# Patient Record
Sex: MALE | Race: WHITE | Employment: OTHER | URBAN - NONMETROPOLITAN AREA
[De-identification: names, ages, dates, MRNs, and addresses within clinical notes are randomized per-mention and may not be internally consistent; named-entity substitution may affect disease eponyms.]

---

## 2019-02-11 LAB
CHOLESTEROL, TOTAL: 161 MG/DL
CHOLESTEROL/HDL RATIO: 3
HDLC SERPL-MCNC: 53 MG/DL (ref 35–70)
LDL CHOLESTEROL CALCULATED: 87 MG/DL (ref 0–160)
TRIGL SERPL-MCNC: 111 MG/DL
VLDLC SERPL CALC-MCNC: NORMAL MG/DL

## 2019-06-12 LAB
ALBUMIN SERPL-MCNC: NORMAL G/DL
ALP BLD-CCNC: NORMAL U/L
ALT SERPL-CCNC: NORMAL U/L
ANION GAP SERPL CALCULATED.3IONS-SCNC: NORMAL MMOL/L
AST SERPL-CCNC: NORMAL U/L
BASOPHILS ABSOLUTE: NORMAL /ΜL
BASOPHILS RELATIVE PERCENT: NORMAL %
BILIRUB SERPL-MCNC: NORMAL MG/DL (ref 0.1–1.4)
BUN BLDV-MCNC: NORMAL MG/DL
CALCIUM SERPL-MCNC: NORMAL MG/DL
CHLORIDE BLD-SCNC: NORMAL MMOL/L
CO2: NORMAL MMOL/L
CREAT SERPL-MCNC: NORMAL MG/DL
EOSINOPHILS ABSOLUTE: NORMAL /ΜL
EOSINOPHILS RELATIVE PERCENT: NORMAL %
GFR CALCULATED: NORMAL
GLUCOSE BLD-MCNC: NORMAL MG/DL
HCT VFR BLD CALC: NORMAL % (ref 41–53)
HEMOGLOBIN: NORMAL G/DL (ref 13.5–17.5)
LYMPHOCYTES ABSOLUTE: NORMAL /ΜL
LYMPHOCYTES RELATIVE PERCENT: NORMAL %
MCH RBC QN AUTO: NORMAL PG
MCHC RBC AUTO-ENTMCNC: NORMAL G/DL
MCV RBC AUTO: NORMAL FL
MONOCYTES ABSOLUTE: NORMAL /ΜL
MONOCYTES RELATIVE PERCENT: NORMAL %
NEUTROPHILS ABSOLUTE: NORMAL /ΜL
NEUTROPHILS RELATIVE PERCENT: NORMAL %
PLATELET # BLD: NORMAL K/ΜL
PMV BLD AUTO: NORMAL FL
POTASSIUM SERPL-SCNC: NORMAL MMOL/L
RBC # BLD: NORMAL 10^6/ΜL
SODIUM BLD-SCNC: NORMAL MMOL/L
TOTAL PROTEIN: NORMAL
WBC # BLD: NORMAL 10^3/ML

## 2019-06-18 LAB
ALBUMIN SERPL-MCNC: NORMAL G/DL
ALP BLD-CCNC: NORMAL U/L
ALT SERPL-CCNC: NORMAL U/L
ANION GAP SERPL CALCULATED.3IONS-SCNC: NORMAL MMOL/L
AST SERPL-CCNC: NORMAL U/L
BILIRUB SERPL-MCNC: NORMAL MG/DL (ref 0.1–1.4)
BUN BLDV-MCNC: NORMAL MG/DL
CALCIUM SERPL-MCNC: NORMAL MG/DL
CHLORIDE BLD-SCNC: NORMAL MMOL/L
CO2: NORMAL MMOL/L
CREAT SERPL-MCNC: NORMAL MG/DL
GFR CALCULATED: NORMAL
GLUCOSE BLD-MCNC: NORMAL MG/DL
POTASSIUM SERPL-SCNC: NORMAL MMOL/L
SODIUM BLD-SCNC: NORMAL MMOL/L
TOTAL PROTEIN: NORMAL

## 2019-06-27 VITALS
HEIGHT: 68 IN | DIASTOLIC BLOOD PRESSURE: 82 MMHG | WEIGHT: 160.25 LBS | BODY MASS INDEX: 24.29 KG/M2 | HEART RATE: 85 BPM | SYSTOLIC BLOOD PRESSURE: 134 MMHG | TEMPERATURE: 98.2 F

## 2019-06-28 ENCOUNTER — OFFICE VISIT (OUTPATIENT)
Dept: FAMILY MEDICINE CLINIC | Age: 74
End: 2019-06-28
Payer: MEDICARE

## 2019-06-28 VITALS
OXYGEN SATURATION: 97 % | DIASTOLIC BLOOD PRESSURE: 92 MMHG | WEIGHT: 149 LBS | TEMPERATURE: 98 F | SYSTOLIC BLOOD PRESSURE: 156 MMHG | HEART RATE: 102 BPM | BODY MASS INDEX: 22.07 KG/M2 | HEIGHT: 69 IN

## 2019-06-28 DIAGNOSIS — C20 RECTAL CANCER (HCC): Primary | ICD-10-CM

## 2019-06-28 DIAGNOSIS — K40.20 NON-RECURRENT BILATERAL INGUINAL HERNIA WITHOUT OBSTRUCTION OR GANGRENE: ICD-10-CM

## 2019-06-28 DIAGNOSIS — R63.4 WEIGHT LOSS: ICD-10-CM

## 2019-06-28 DIAGNOSIS — Z92.21 STATUS POST CHEMOTHERAPY: ICD-10-CM

## 2019-06-28 PROCEDURE — 99214 OFFICE O/P EST MOD 30 MIN: CPT | Performed by: INTERNAL MEDICINE

## 2019-06-28 RX ORDER — AZITHROMYCIN 250 MG/1
TABLET, FILM COATED ORAL
Refills: 0 | COMMUNITY
Start: 2019-06-12 | End: 2019-09-20 | Stop reason: ALTCHOICE

## 2019-06-28 RX ORDER — PROMETHAZINE HYDROCHLORIDE AND CODEINE PHOSPHATE 6.25; 1 MG/5ML; MG/5ML
SOLUTION ORAL
Refills: 0 | COMMUNITY
Start: 2019-06-12 | End: 2019-09-20 | Stop reason: ALTCHOICE

## 2019-06-28 ASSESSMENT — ENCOUNTER SYMPTOMS
EYES NEGATIVE: 1
RHINORRHEA: 0
VOMITING: 0
DIARRHEA: 0
RECTAL PAIN: 0
CONSTIPATION: 0
COUGH: 1
NAUSEA: 0
BLOOD IN STOOL: 0
ABDOMINAL PAIN: 0
WHEEZING: 0
BACK PAIN: 0
SHORTNESS OF BREATH: 0
SINUS PAIN: 0

## 2019-06-28 ASSESSMENT — PATIENT HEALTH QUESTIONNAIRE - PHQ9
SUM OF ALL RESPONSES TO PHQ9 QUESTIONS 1 & 2: 0
SUM OF ALL RESPONSES TO PHQ QUESTIONS 1-9: 0
SUM OF ALL RESPONSES TO PHQ QUESTIONS 1-9: 0
1. LITTLE INTEREST OR PLEASURE IN DOING THINGS: 0
2. FEELING DOWN, DEPRESSED OR HOPELESS: 0

## 2019-06-28 NOTE — PROGRESS NOTES
[address] [facility] [address]     19  Brad Lopez : 1945 Sex: male  Age: 69 y.o. Chief Complaint   Patient presents with    Follow-Up from Hospital     cough, congestion   Follow-up on medical problems    HPI patient presents today for follow-up visit on his medical problems. Recently diagnosed with rectal cancer now has mets. Following with oncology and surgery. He has had a Mediport placed recently and is finished up chemotherapy. I did look over oncology and surgery notes. Patient did have some recent coughing was placed on antibiotic and is doing better. Review of Systems   Constitutional: Positive for unexpected weight change. Negative for activity change, appetite change, chills, diaphoresis, fatigue and fever. HENT: Negative for congestion, ear pain, hearing loss, postnasal drip, rhinorrhea and sinus pain. Eyes: Negative. Respiratory: Positive for cough. Negative for shortness of breath and wheezing. Improving   Cardiovascular: Negative for chest pain, palpitations and leg swelling. Gastrointestinal: Negative for abdominal pain, blood in stool, constipation, diarrhea, nausea, rectal pain and vomiting. Endocrine: Negative. Genitourinary: Negative for difficulty urinating, dysuria, frequency, hematuria and urgency. Musculoskeletal: Negative for arthralgias, back pain, gait problem and myalgias. Skin: Negative. Allergic/Immunologic: Negative for environmental allergies and immunocompromised state. Neurological: Negative for dizziness, weakness, light-headedness, numbness and headaches. Hematological: Negative. Psychiatric/Behavioral: Negative for dysphoric mood and sleep disturbance. The patient is not nervous/anxious. REST OF PERTINENT ROS GONE OVER AND WAS NEGATIVE.        Current Outpatient Medications:     azithromycin (ZITHROMAX) 250 MG tablet, , Disp: , Rfl: 0    promethazine-codeine (PHENERGAN WITH CODEINE) 6.25-10 MG/5ML SOLN solution, TK 5 SpO2: 97%   Weight: 149 lb (67.6 kg)   Height: 5' 9\" (1.753 m)       Physical Exam   Constitutional: He is oriented to person, place, and time. He appears well-developed and well-nourished. No distress. Neck: Normal range of motion. Neck supple. No JVD present. No thyromegaly present. Cardiovascular: Normal rate, regular rhythm, normal heart sounds and intact distal pulses. Exam reveals no gallop and no friction rub. No murmur heard. Pulmonary/Chest: Effort normal and breath sounds normal. No respiratory distress. He has no wheezes. He has no rales. Few scattered rhonchi in the bases bilaterally   Abdominal: Soft. Bowel sounds are normal. He exhibits no distension and no mass. There is no tenderness. Musculoskeletal: Normal range of motion. He exhibits no edema, tenderness or deformity. Lymphadenopathy:     He has no cervical adenopathy. He has no axillary adenopathy. Right: No inguinal adenopathy present. Left: No inguinal adenopathy present. Neurological: He is alert and oriented to person, place, and time. No sensory deficit. He exhibits normal muscle tone. Coordination normal.   Skin: Skin is warm and dry. No rash noted. No erythema. Psychiatric: He has a normal mood and affect. His behavior is normal.   Nursing note and vitals reviewed. Assessment and Plan:  Buddy was seen today for follow-up from hospital.    Diagnoses and all orders for this visit:    Rectal cancer ([facility] [address]    Weight loss    Status post chemotherapy    Non-recurrent bilateral inguinal hernia without obstruction or gangrene    Plan: I will see him back in 3 months and as needed. Continue follow-up with oncology and surgery. Notify us with problems in the interim. Will attempt to get recent labs from oncology. Fall precautions. Return in about 3 months (around 9/28/2019). Seen By:  Jasiel Garduno MD      *Document was created using voice recognition software.   Note was reviewed however may contain grammatical errors.

## 2019-09-20 ENCOUNTER — OFFICE VISIT (OUTPATIENT)
Dept: FAMILY MEDICINE CLINIC | Age: 74
End: 2019-09-20
Payer: MEDICARE

## 2019-09-20 VITALS
BODY MASS INDEX: 22.45 KG/M2 | HEART RATE: 82 BPM | DIASTOLIC BLOOD PRESSURE: 74 MMHG | SYSTOLIC BLOOD PRESSURE: 142 MMHG | OXYGEN SATURATION: 98 % | WEIGHT: 152 LBS

## 2019-09-20 DIAGNOSIS — D70.1 CHEMOTHERAPY-INDUCED NEUTROPENIA (HCC): ICD-10-CM

## 2019-09-20 DIAGNOSIS — E87.6 HYPOKALEMIA: ICD-10-CM

## 2019-09-20 DIAGNOSIS — T45.1X5A CHEMOTHERAPY-INDUCED NEUTROPENIA (HCC): ICD-10-CM

## 2019-09-20 DIAGNOSIS — C20 RECTAL CANCER (HCC): Primary | ICD-10-CM

## 2019-09-20 PROCEDURE — 1036F TOBACCO NON-USER: CPT | Performed by: INTERNAL MEDICINE

## 2019-09-20 PROCEDURE — 99213 OFFICE O/P EST LOW 20 MIN: CPT | Performed by: INTERNAL MEDICINE

## 2019-09-20 PROCEDURE — G8427 DOCREV CUR MEDS BY ELIG CLIN: HCPCS | Performed by: INTERNAL MEDICINE

## 2019-09-20 PROCEDURE — 3017F COLORECTAL CA SCREEN DOC REV: CPT | Performed by: INTERNAL MEDICINE

## 2019-09-20 PROCEDURE — G8420 CALC BMI NORM PARAMETERS: HCPCS | Performed by: INTERNAL MEDICINE

## 2019-09-20 PROCEDURE — 1123F ACP DISCUSS/DSCN MKR DOCD: CPT | Performed by: INTERNAL MEDICINE

## 2019-09-20 PROCEDURE — 4040F PNEUMOC VAC/ADMIN/RCVD: CPT | Performed by: INTERNAL MEDICINE

## 2019-09-20 RX ORDER — ONDANSETRON 8 MG/1
TABLET, ORALLY DISINTEGRATING ORAL
COMMUNITY
Start: 2019-07-09

## 2019-10-30 ENCOUNTER — OFFICE VISIT (OUTPATIENT)
Dept: FAMILY MEDICINE CLINIC | Age: 74
End: 2019-10-30
Payer: MEDICARE

## 2019-10-30 VITALS
BODY MASS INDEX: 22.92 KG/M2 | SYSTOLIC BLOOD PRESSURE: 142 MMHG | OXYGEN SATURATION: 97 % | HEART RATE: 98 BPM | WEIGHT: 155.2 LBS | DIASTOLIC BLOOD PRESSURE: 60 MMHG | TEMPERATURE: 97.7 F

## 2019-10-30 DIAGNOSIS — R63.4 WEIGHT LOSS: ICD-10-CM

## 2019-10-30 DIAGNOSIS — C20 RECTAL CANCER (HCC): Primary | ICD-10-CM

## 2019-10-30 DIAGNOSIS — E43 EDEMA DUE TO MALNUTRITION, DUE TO UNSPECIFIED MALNUTRITION TYPE (HCC): ICD-10-CM

## 2019-10-30 DIAGNOSIS — E87.6 HYPOKALEMIA: ICD-10-CM

## 2019-10-30 PROCEDURE — 1123F ACP DISCUSS/DSCN MKR DOCD: CPT | Performed by: INTERNAL MEDICINE

## 2019-10-30 PROCEDURE — 99214 OFFICE O/P EST MOD 30 MIN: CPT | Performed by: INTERNAL MEDICINE

## 2019-10-30 PROCEDURE — G8484 FLU IMMUNIZE NO ADMIN: HCPCS | Performed by: INTERNAL MEDICINE

## 2019-10-30 PROCEDURE — G8420 CALC BMI NORM PARAMETERS: HCPCS | Performed by: INTERNAL MEDICINE

## 2019-10-30 PROCEDURE — G8427 DOCREV CUR MEDS BY ELIG CLIN: HCPCS | Performed by: INTERNAL MEDICINE

## 2019-10-30 PROCEDURE — 1036F TOBACCO NON-USER: CPT | Performed by: INTERNAL MEDICINE

## 2019-10-30 PROCEDURE — 4040F PNEUMOC VAC/ADMIN/RCVD: CPT | Performed by: INTERNAL MEDICINE

## 2019-10-30 PROCEDURE — 3017F COLORECTAL CA SCREEN DOC REV: CPT | Performed by: INTERNAL MEDICINE

## 2019-10-30 RX ORDER — FUROSEMIDE 20 MG/1
20 TABLET ORAL DAILY PRN
Qty: 4 TABLET | Refills: 0 | Status: SHIPPED
Start: 2019-10-30 | End: 2020-03-05 | Stop reason: SDUPTHER

## 2019-10-30 RX ORDER — POTASSIUM CHLORIDE 20 MEQ/1
20 TABLET, EXTENDED RELEASE ORAL DAILY
Qty: 4 TABLET | Refills: 0 | Status: SHIPPED | OUTPATIENT
Start: 2019-10-30

## 2019-12-23 ENCOUNTER — OFFICE VISIT (OUTPATIENT)
Dept: FAMILY MEDICINE CLINIC | Age: 74
End: 2019-12-23
Payer: MEDICARE

## 2019-12-23 VITALS
HEIGHT: 69 IN | SYSTOLIC BLOOD PRESSURE: 100 MMHG | BODY MASS INDEX: 21.06 KG/M2 | WEIGHT: 142.2 LBS | HEART RATE: 78 BPM | DIASTOLIC BLOOD PRESSURE: 56 MMHG

## 2019-12-23 DIAGNOSIS — C20 RECTAL CANCER (HCC): Primary | ICD-10-CM

## 2019-12-23 DIAGNOSIS — R63.4 WEIGHT LOSS: ICD-10-CM

## 2019-12-23 DIAGNOSIS — E43 EDEMA DUE TO MALNUTRITION, DUE TO UNSPECIFIED MALNUTRITION TYPE (HCC): ICD-10-CM

## 2019-12-23 PROCEDURE — 99213 OFFICE O/P EST LOW 20 MIN: CPT | Performed by: INTERNAL MEDICINE

## 2019-12-23 PROCEDURE — G8420 CALC BMI NORM PARAMETERS: HCPCS | Performed by: INTERNAL MEDICINE

## 2019-12-23 PROCEDURE — G8484 FLU IMMUNIZE NO ADMIN: HCPCS | Performed by: INTERNAL MEDICINE

## 2019-12-23 PROCEDURE — 1036F TOBACCO NON-USER: CPT | Performed by: INTERNAL MEDICINE

## 2019-12-23 PROCEDURE — 1123F ACP DISCUSS/DSCN MKR DOCD: CPT | Performed by: INTERNAL MEDICINE

## 2019-12-23 PROCEDURE — 3017F COLORECTAL CA SCREEN DOC REV: CPT | Performed by: INTERNAL MEDICINE

## 2019-12-23 PROCEDURE — 4040F PNEUMOC VAC/ADMIN/RCVD: CPT | Performed by: INTERNAL MEDICINE

## 2019-12-23 PROCEDURE — G8427 DOCREV CUR MEDS BY ELIG CLIN: HCPCS | Performed by: INTERNAL MEDICINE

## 2019-12-23 RX ORDER — GABAPENTIN 300 MG/1
300 CAPSULE ORAL DAILY
COMMUNITY

## 2019-12-23 RX ORDER — DIPHENOXYLATE HYDROCHLORIDE AND ATROPINE SULFATE 2.5; .025 MG/1; MG/1
1 TABLET ORAL 3 TIMES DAILY PRN
COMMUNITY

## 2019-12-23 RX ORDER — SPIRONOLACTONE 25 MG/1
25 TABLET ORAL 2 TIMES DAILY
COMMUNITY
End: 2020-02-10 | Stop reason: ALTCHOICE

## 2020-01-03 ENCOUNTER — TELEPHONE (OUTPATIENT)
Dept: FAMILY MEDICINE CLINIC | Age: 75
End: 2020-01-03

## 2020-01-03 NOTE — TELEPHONE ENCOUNTER
Harris Ellison - Daughter   [phone]      She is calling to tell you that Nayla Barobsa has had diarrhea for 3 weeks. He was put on Lomotil - 2 tabs 3 times a day by the cancer doctor after trying Imodium and Pepto. She tried to reach them to tell then he is having no relief yet. They are gone for the rest of the day and was told to call his PCP. Please advise.

## 2020-01-04 NOTE — TELEPHONE ENCOUNTER
Unfortunately I do not have anything stronger than Lomotil. Oncology should have somebody on call just like we do. If they cannot reach them through the hospital she may need to take him to the emergency room if it is bad enough to what they can do.

## 2020-01-06 NOTE — TELEPHONE ENCOUNTER
Spoke with Sang Crouch - she states he did go to ER & has been feeling better - ER adjusted diet & he was given fluids.

## 2020-01-28 ENCOUNTER — TELEPHONE (OUTPATIENT)
Dept: FAMILY MEDICINE CLINIC | Age: 75
End: 2020-01-28

## 2020-01-29 ENCOUNTER — TELEPHONE (OUTPATIENT)
Dept: FAMILY MEDICINE CLINIC | Age: 75
End: 2020-01-29

## 2020-01-29 NOTE — TELEPHONE ENCOUNTER
[facility] calling; she saw patient today. He has 3+pitting edema bilateral lower extreme ties. Also 1+pitting edema of left lower arm&hand. Family states they fluid over loaded him at the hospital, it is normal for him to have swelling. She states he has no sob or symptoms, he feels fine. She wanted to make you aware. Also is it okay for in home physical therapy, dx generalized weakness ?

## 2020-01-30 LAB
ALBUMIN SERPL-MCNC: 1.8 G/DL
ALP BLD-CCNC: 370 U/L
ALT SERPL-CCNC: 85 U/L
ANION GAP SERPL CALCULATED.3IONS-SCNC: 0.5 MMOL/L
AST SERPL-CCNC: 63 U/L
BASOPHILS ABSOLUTE: 0 /ΜL
BASOPHILS RELATIVE PERCENT: 0 %
BILIRUB SERPL-MCNC: 1.1 MG/DL (ref 0.1–1.4)
BUN BLDV-MCNC: 54 MG/DL
CALCIUM SERPL-MCNC: 7.6 MG/DL
CHLORIDE BLD-SCNC: 107 MMOL/L
CO2: 25 MMOL/L
CREAT SERPL-MCNC: 1.4 MG/DL
EOSINOPHILS ABSOLUTE: 1 /ΜL
EOSINOPHILS RELATIVE PERCENT: 1 %
GFR CALCULATED: NORMAL
GLUCOSE BLD-MCNC: 63 MG/DL
HCT VFR BLD CALC: 32.4 % (ref 41–53)
HEMOGLOBIN: 10.2 G/DL (ref 13.5–17.5)
LYMPHOCYTES ABSOLUTE: 0 /ΜL
LYMPHOCYTES RELATIVE PERCENT: 0 %
MCH RBC QN AUTO: 31.7 PG
MCHC RBC AUTO-ENTMCNC: 31.6 G/DL
MCV RBC AUTO: 100.4 FL
MONOCYTES ABSOLUTE: 3 /ΜL
MONOCYTES RELATIVE PERCENT: 3 %
NEUTROPHILS ABSOLUTE: 11 /ΜL
NEUTROPHILS RELATIVE PERCENT: 83 %
PLATELET # BLD: 115 K/ΜL
PMV BLD AUTO: 11.4 FL
POTASSIUM SERPL-SCNC: 3.9 MMOL/L
RBC # BLD: 3.22 10^6/ΜL
SODIUM BLD-SCNC: 142 MMOL/L
TOTAL PROTEIN: 5.1
WBC # BLD: 72.1 10^3/ML

## 2020-02-03 ENCOUNTER — NURSE TRIAGE (OUTPATIENT)
Dept: OTHER | Facility: CLINIC | Age: 75
End: 2020-02-03

## 2020-02-10 ENCOUNTER — OFFICE VISIT (OUTPATIENT)
Dept: FAMILY MEDICINE CLINIC | Age: 75
End: 2020-02-10
Payer: MEDICARE

## 2020-02-10 VITALS
HEART RATE: 104 BPM | SYSTOLIC BLOOD PRESSURE: 110 MMHG | HEIGHT: 66 IN | OXYGEN SATURATION: 92 % | DIASTOLIC BLOOD PRESSURE: 68 MMHG | BODY MASS INDEX: 29.41 KG/M2 | WEIGHT: 183 LBS

## 2020-02-10 PROCEDURE — G8484 FLU IMMUNIZE NO ADMIN: HCPCS | Performed by: INTERNAL MEDICINE

## 2020-02-10 PROCEDURE — 4040F PNEUMOC VAC/ADMIN/RCVD: CPT | Performed by: INTERNAL MEDICINE

## 2020-02-10 PROCEDURE — 1123F ACP DISCUSS/DSCN MKR DOCD: CPT | Performed by: INTERNAL MEDICINE

## 2020-02-10 PROCEDURE — 1036F TOBACCO NON-USER: CPT | Performed by: INTERNAL MEDICINE

## 2020-02-10 PROCEDURE — G8419 CALC BMI OUT NRM PARAM NOF/U: HCPCS | Performed by: INTERNAL MEDICINE

## 2020-02-10 PROCEDURE — 99215 OFFICE O/P EST HI 40 MIN: CPT | Performed by: INTERNAL MEDICINE

## 2020-02-10 PROCEDURE — G8427 DOCREV CUR MEDS BY ELIG CLIN: HCPCS | Performed by: INTERNAL MEDICINE

## 2020-02-10 PROCEDURE — 3017F COLORECTAL CA SCREEN DOC REV: CPT | Performed by: INTERNAL MEDICINE

## 2020-02-10 RX ORDER — DOCUSATE SODIUM 100 MG/1
100 CAPSULE, LIQUID FILLED ORAL DAILY
COMMUNITY

## 2020-02-10 RX ORDER — PANTOPRAZOLE SODIUM 40 MG/1
40 TABLET, DELAYED RELEASE ORAL DAILY
COMMUNITY
End: 2020-03-04 | Stop reason: SDUPTHER

## 2020-02-10 NOTE — PROGRESS NOTES
[address] [facility] [address]      2/10/20  Brad Lopez : 1945 Sex: male  Age: 69 y.o. Chief Complaint   Patient presents with    Follow-Up from Hospital       HPI    Patient presents today for post hospital follow-up visit x2. I had really no information as far as discharge summary and had to obtain it during the visit. I did get a copy of the last discharge but still do not have the first discharge. I did review all of the labs, x-rays and documents available to me. 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 Chemotherapy has been deferred recently because of his illness. He is supposed to have chemotherapy tomorrow however I doubt this is going to take place. I did personally speak with his oncologist Dr. Carol Stevens who apparently did not get any of the hospital information either. I did explain the situation and he is going to see him tomorrow. Again it sounds like chemotherapy is going to be deferred. Patient did have group B strep bacteremia and septic shock on his first admission. Back in because of increasing some lower extremity edema fatigue and anemia. Did have a 3 L paracentesis done a couple days ago. It already looks like he is filling back up. Son and daughter-in-law are present in the room as well. They state he is coughing more since the fluid reaccumulation. This did subside shortly after last paracentesis. Patient did receive blood transfusion on his last admission. He had initial hemoglobin of 6.7 transfused up to 9.1. He also did have a large hematoma felt to be pectoralis related with significant bruising down the right side. States he has no pain to this area. No history of known injury. It was also noted that they wanted to remove his foot secondary to his bacteremia but patient and family refused to have that done. Infectious disease was on the case and evaluated things.   Patient has had significant weight gain over the last couple months related to his edema/ascites. Review of Systems   Constitutional: Positive for  weight change.     Weight gain with edema and ascites. fatigue negative for activity change, chills, diaphoresis,  and fever. HENT: Negative for congestion, ear pain, hearing loss, postnasal drip, rhinorrhea and sinus pain.    Eyes: Negative.    Respiratory:  Negative for shortness of breath, and wheezing.  Positive for cough.   Cardiovascular: Negative for chest pain, palpitations positive for significant lower extremity edema and ascites   gastrointestinal: Negative for abdominal pain, blood in stool, constipation, diarrhea, nausea, rectal pain and vomiting. Positive for ascites  Endocrine: Negative.    Genitourinary: Negative for difficulty urinating, dysuria, frequency, hematuria and urgency. Musculoskeletal: Negative for arthralgias, back pain, gait problem and myalgias. Skin: Negative.    Allergic/Immunologic: Negative for environmental allergies. Positive immunosuppressed state with his malignancy and recent chemotherapy neurological: Negative for dizziness, light-headedness, numbness and headaches. Positive for weakness  Hematological: Negative.    Psychiatric/Behavioral: Negative for dysphoric mood and sleep disturbance. The patient is not nervous/anxious. REST OF PERTINENT ROS GONE OVER AND WAS NEGATIVE. PMH:  Health Maintenance:  Colonoscopy - refused,3/19  Prostate Exam - 10/14, , ,   Psa Test - , , ,   Rectal Exam - 10/14, , , ,  EKG - ,  Medical Problems:  inguinal hernia, Hearing Loss, Rectal Cancer-metastatic   hospitalization x2 group B strep bacteremia, edema and ascites, anemia with transfusion, metastatic rectal CA  FH:  Father:  Hypertension  Congestive Heart Failure (CHF) -  age 61. Mother:  Congestive Heart Failure (CHF)  Colon Cancer -  age 80. Brother 1:  Hypertension.   Sister 1:  Hypertension. SH:  . (Marital)retired furniture /loading  Personal Habits: Cigarette Use: Does Not Smoke. Alcohol: Occasionally consumes alcohol           Current Outpatient Medications:     docusate sodium (COLACE) 100 MG capsule, Take 100 mg by mouth daily, Disp: , Rfl:     pantoprazole (PROTONIX) 40 MG tablet, Take 40 mg by mouth daily, Disp: , Rfl:     Handicap Placard [facility], by Does not apply route Duration: 5 years, Disp: 1 each, Rfl: 0    gabapentin (NEURONTIN) 300 MG capsule, Take 300 mg by mouth daily. , Disp: , Rfl:     diphenoxylate-atropine (LOMOTIL) 2.5-0.025 MG per tablet, Take 1 tablet by mouth 3 times daily as needed for Diarrhea., Disp: , Rfl:     Elastic Bandages & Supports ([address]) [facility], 1 each by Does not apply route daily 20-30mmhg compression, Disp: 1 each, Rfl: 0    potassium chloride (KLOR-CON M) 20 MEQ extended release tablet, Take 1 tablet by mouth daily (Patient taking differently: Take 10 mEq by mouth 2 times daily ), Disp: 4 tablet, Rfl: 0    furosemide (LASIX) 20 MG tablet, Take 1 tablet by mouth daily as needed (edema), Disp: 4 tablet, Rfl: 0    ondansetron (ZOFRAN-ODT) 8 MG TBDP disintegrating tablet, , Disp: , Rfl:   Allergies   Allergen Reactions    Bee Venom        Past Medical History:   Diagnosis Date    Hearing loss     Inguinal hernia     Non-recurrent bilateral inguinal hernia without obstruction or gangrene 2019    Rectal cancer ([facility] [address]     rectal cancer    Weight loss 2019     No past surgical history on file. Family History   Problem Relation Age of Onset    Other Mother         congestive heart failure     Colon Cancer Mother          age 80    High Blood Pressure Father     Other Father         congestive heart failure  at age 61   Sherri Bio High Blood Pressure Sister     High Blood Pressure Brother      Social History     Socioeconomic History    Marital status:       Spouse name: Not on file    Number of children: Not on file    Years of education: Not on file    Highest education level: Not on file   Occupational History    Not on file   Social Needs    Financial resource strain: Not on file    Food insecurity:     Worry: Not on file     Inability: Not on file    Transportation needs:     Medical: Not on file     Non-medical: Not on file   Tobacco Use    Smoking status: Never Smoker    Smokeless tobacco: Never Used   Substance and Sexual Activity    Alcohol use: Yes    Drug use: Not on file    Sexual activity: Not on file   Lifestyle    Physical activity:     Days per week: Not on file     Minutes per session: Not on file    Stress: Not on file   Relationships    Social connections:     Talks on phone: Not on file     Gets together: Not on file     Attends Sabianism service: Not on file     Active member of club or organization: Not on file     Attends meetings of clubs or organizations: Not on file     Relationship status: Not on file    Intimate partner violence:     Fear of current or ex partner: Not on file     Emotionally abused: Not on file     Physically abused: Not on file     Forced sexual activity: Not on file   Other Topics Concern    Not on file   Social History Narrative    Not on file       Vitals:    02/10/20 0857   BP: 110/68   Pulse: 104   SpO2: 92%   Weight: 183 lb (83 kg)   Height: 5' 6\" (1.676 m)       Physical Exam    Constitutional: He is oriented to person, place, and time. He appears poorly nourished fragile and feeble  Neck: Normal range of motion. Neck supple. No JVD present. No thyromegaly present. Cardiovascular: Normal rate, regular rhythm, normal heart sounds and intact distal pulses. Exam reveals no gallop and no friction rub.   No murmur heard. Pulmonary/Chest: Patient has diminished breath sounds in the bases bilaterally few scattered rhonchi. . No respiratory distress. He has no wheezes.  He has no rales.   Few scattered rhonchi in the bases a highly complex visit lasting greater than 40 minutes with greater than 50% of the time spent in counseling and coordination of care reviewing records speaking with the oncologist.  Return in about 1 month (around 3/10/2020). Seen By:  Arlene Clay MD      *Document was created using voice recognition software. Note was reviewed however may contain grammatical errors.

## 2020-02-11 ENCOUNTER — TELEPHONE (OUTPATIENT)
Dept: FAMILY MEDICINE CLINIC | Age: 75
End: 2020-02-11

## 2020-02-11 ENCOUNTER — HOSPITAL ENCOUNTER (OUTPATIENT)
Age: 75
Discharge: HOME | End: 2020-02-13
Payer: MEDICARE

## 2020-02-11 PROCEDURE — 87040 BLOOD CULTURE FOR BACTERIA: CPT

## 2020-02-11 NOTE — TELEPHONE ENCOUNTER
Ezra Chiang she stated that they were just wanting something for his cough she stated he Is fine its just a little wheezing but nothing that she feels he needs to go to the ER about  and he did see Dr. Dang Souza today.

## 2020-02-12 ENCOUNTER — TELEPHONE (OUTPATIENT)
Dept: FAMILY MEDICINE CLINIC | Age: 75
End: 2020-02-12

## 2020-02-12 RX ORDER — BENZONATATE 100 MG/1
100 CAPSULE ORAL 3 TIMES DAILY PRN
Qty: 30 CAPSULE | Refills: 0 | Status: SHIPPED
Start: 2020-02-12 | End: 2020-03-23 | Stop reason: SDUPTHER

## 2020-02-12 NOTE — TELEPHONE ENCOUNTER
[facility] calling in. They have resumed care with patient and will see him for a couple weeks. They state the referral asked them to assess patient for physical therapy but patient's son is declining that at this time. They just wanted to update you.   Thanks

## 2020-02-16 LAB — BLOOD CULTURE, ROUTINE: NORMAL

## 2020-02-20 ENCOUNTER — TELEPHONE (OUTPATIENT)
Dept: FAMILY MEDICINE CLINIC | Age: 75
End: 2020-02-20

## 2020-03-04 RX ORDER — FUROSEMIDE 20 MG/1
20 TABLET ORAL DAILY PRN
Qty: 30 TABLET | Refills: 0 | Status: CANCELLED | OUTPATIENT
Start: 2020-03-04

## 2020-03-04 RX ORDER — PANTOPRAZOLE SODIUM 40 MG/1
40 TABLET, DELAYED RELEASE ORAL DAILY
Qty: 30 TABLET | Refills: 0 | Status: SHIPPED | OUTPATIENT
Start: 2020-03-04

## 2020-03-04 NOTE — TELEPHONE ENCOUNTER
Last Appointment:  2/10/2020  Future Appointments   Date Time Provider Sedrick Rogers   3/23/2020 11:00 AM Viky Chan  [facility]      Patient's daughter calling in to request a refill on Furosemide and Pantoprazole. He is out before his next appt.   Order pended, thank you

## 2020-03-04 NOTE — TELEPHONE ENCOUNTER
I sent pantoprazole over but I am not going to send furosemide until you check with them again to make sure he is not taking bumetanide instead of furosemide. He just saw hematology oncology on the 25th and in their note they have him down as bumetanide. Please have the individual have the bottle in hand and make sure of this.   Re que it at that point please

## 2020-03-05 RX ORDER — FUROSEMIDE 20 MG/1
20 TABLET ORAL DAILY PRN
Qty: 30 TABLET | Refills: 0 | Status: SHIPPED | OUTPATIENT
Start: 2020-03-05

## 2020-03-20 ENCOUNTER — TELEPHONE (OUTPATIENT)
Dept: FAMILY MEDICINE CLINIC | Age: 75
End: 2020-03-20

## 2020-03-20 NOTE — TELEPHONE ENCOUNTER
Per doctors request, rescheduled patient PCP 3-23 visit d/t covid, rescheduled patient for first available 30 min visit at the end of may and then notified patient's daughter [facility] to call in with any needs, questions and or concerns prior to visit if needed.

## 2020-03-23 ENCOUNTER — OFFICE VISIT (OUTPATIENT)
Dept: PRIMARY CARE CLINIC | Age: 75
End: 2020-03-23
Payer: MEDICARE

## 2020-03-23 VITALS
SYSTOLIC BLOOD PRESSURE: 115 MMHG | RESPIRATION RATE: 20 BRPM | TEMPERATURE: 98.6 F | OXYGEN SATURATION: 92 % | DIASTOLIC BLOOD PRESSURE: 60 MMHG | HEART RATE: 106 BPM

## 2020-03-23 PROCEDURE — 1036F TOBACCO NON-USER: CPT | Performed by: PHYSICIAN ASSISTANT

## 2020-03-23 PROCEDURE — G8417 CALC BMI ABV UP PARAM F/U: HCPCS | Performed by: PHYSICIAN ASSISTANT

## 2020-03-23 PROCEDURE — G8427 DOCREV CUR MEDS BY ELIG CLIN: HCPCS | Performed by: PHYSICIAN ASSISTANT

## 2020-03-23 PROCEDURE — 4040F PNEUMOC VAC/ADMIN/RCVD: CPT | Performed by: PHYSICIAN ASSISTANT

## 2020-03-23 PROCEDURE — 1123F ACP DISCUSS/DSCN MKR DOCD: CPT | Performed by: PHYSICIAN ASSISTANT

## 2020-03-23 PROCEDURE — 99213 OFFICE O/P EST LOW 20 MIN: CPT | Performed by: PHYSICIAN ASSISTANT

## 2020-03-23 PROCEDURE — 3017F COLORECTAL CA SCREEN DOC REV: CPT | Performed by: PHYSICIAN ASSISTANT

## 2020-03-23 PROCEDURE — G8484 FLU IMMUNIZE NO ADMIN: HCPCS | Performed by: PHYSICIAN ASSISTANT

## 2020-03-23 RX ORDER — BENZONATATE 100 MG/1
100 CAPSULE ORAL 3 TIMES DAILY PRN
Qty: 30 CAPSULE | Refills: 0 | Status: SHIPPED | OUTPATIENT
Start: 2020-03-23 | End: 2020-03-30

## 2020-03-23 NOTE — PROGRESS NOTES
Dimitry Serrano John  1945    Chief Complaint   Patient presents with    Cough       Respiratory Symptoms:  Patient complains of \"several\" month(s) history of cough. Non productive. Symptoms have been unchanged with time. He is not worsening. He denies headache. No fevers. He believes le edema is at baseline. No cp. Reviewed Dr Regis Eugene last note. Relevant PMH: rectal cancer. Smoking history:  He  reports that he has never smoked. He has never used smokeless tobacco.     He has had no known ill contacts. Treatment to date: cough syrup . lasix    Travel screen completed:  Yes    BPA for COVID triggered: Yes      Vitals:    03/23/20 1056   BP: 115/60   Pulse: 106   Resp: 20   Temp: 98.6 °F (37 °C)   TempSrc: Oral   SpO2: (!) 89%      Physical Exam  Constitutional:       General: He is not in acute distress. Appearance: He is well-developed. HENT:      Head: Normocephalic and atraumatic. Right Ear: External ear normal.      Left Ear: External ear normal.      Nose: Nose normal.   Eyes:      General: No scleral icterus. Conjunctiva/sclera: Conjunctivae normal.      Pupils: Pupils are equal, round, and reactive to light. Neck:      Musculoskeletal: Normal range of motion and neck supple. Thyroid: No thyromegaly. Cardiovascular:      Rate and Rhythm: Normal rate and regular rhythm. Heart sounds: Normal heart sounds. No murmur. Pulmonary:      Effort: Pulmonary effort is normal. No accessory muscle usage or respiratory distress. Breath sounds: Decreased air movement present. Examination of the right-middle field reveals decreased breath sounds. Examination of the left-middle field reveals decreased breath sounds. Examination of the right-lower field reveals decreased breath sounds. Examination of the left-lower field reveals decreased breath sounds. Decreased breath sounds present. No wheezing.       Comments: + crackles 2/3 way up on right  Musculoskeletal: Normal range of

## 2020-03-26 RX ORDER — LEVOFLOXACIN 250 MG/1
250 TABLET ORAL DAILY
Qty: 7 TABLET | Refills: 0 | Status: SHIPPED | OUTPATIENT
Start: 2020-03-26 | End: 2020-04-02

## 2020-04-01 ENCOUNTER — TELEPHONE (OUTPATIENT)
Dept: FAMILY MEDICINE CLINIC | Age: 75
End: 2020-04-01

## 2020-04-01 NOTE — TELEPHONE ENCOUNTER
Dr Cuca Casarez his oncologist must have ordered that. It was not me. The should speak with him.   I do not know who did his paracentesis in the past

## 2020-04-01 NOTE — TELEPHONE ENCOUNTER
Spoke with Lia Carpenter pts daughter in law she says that pt is filling up with fluid again and that last time he was sent to hospital to have it drained and is wondering if this needs done again. Please advise.

## 2020-04-02 ENCOUNTER — TELEPHONE (OUTPATIENT)
Dept: PRIMARY CARE CLINIC | Age: 75
End: 2020-04-02

## 2020-04-02 NOTE — TELEPHONE ENCOUNTER
Left message for Mindy Cristobal to call oncology and if any questions to give the office a call. See previous telephone encounters.

## 2020-04-13 ENCOUNTER — TELEPHONE (OUTPATIENT)
Dept: FAMILY MEDICINE CLINIC | Age: 75
End: 2020-04-13

## 2020-04-13 RX ORDER — BENZONATATE 100 MG/1
100 CAPSULE ORAL 3 TIMES DAILY PRN
Qty: 30 CAPSULE | Refills: 0 | Status: SHIPPED | OUTPATIENT
Start: 2020-04-13 | End: 2020-04-20

## 2020-04-13 RX ORDER — DOXYCYCLINE HYCLATE 100 MG
100 TABLET ORAL 2 TIMES DAILY
Qty: 20 TABLET | Refills: 0 | Status: SHIPPED | OUTPATIENT
Start: 2020-04-13 | End: 2020-04-23

## 2020-04-13 NOTE — TELEPHONE ENCOUNTER
Pt's daughter in law Sherrell Mayfield called in stating the pt was recently released from [facility] with pneumonia and is still having symptoms of a cough and chest congestion. Sherrell Mayfield states the pt has had these symptoms for about 1 1/2 days now and she is requesting an antibiotic be sent to the pt's pharmacy [facility] in [place]. Pt was tested for Covid while in the hospital at [facility] and was negative.  Kathy's phone number: [phone]

## 2020-05-27 ENCOUNTER — TELEPHONE (OUTPATIENT)
Dept: FAMILY MEDICINE CLINIC | Age: 75
End: 2020-05-27

## 2020-08-11 ENCOUNTER — TELEPHONE (OUTPATIENT)
Dept: FAMILY MEDICINE CLINIC | Age: 75
End: 2020-08-11

## 2020-08-11 NOTE — TELEPHONE ENCOUNTER
I will follow as long as they are not going to ask me for face-to-face papers or documentation to that effect as he does not come to the office anymore. He has been following with oncology and radiology for therapeutic paracentesis.

## 2020-08-11 NOTE — TELEPHONE ENCOUNTER
Jamie Mera Visiting Nurse      She is asking if you will follow orders for this patient coming out of [facility]. He was admitted with bloody stools, weaness and history of cancer. They are asking for skilled nursing Pt & Ot.

## 2020-08-12 NOTE — TELEPHONE ENCOUNTER
ANSON garcia calling in she did fax forms over for dr review.  Drs nurse please call her to verify received

## 2020-08-14 ENCOUNTER — TELEPHONE (OUTPATIENT)
Dept: FAMILY MEDICINE CLINIC | Age: 75
End: 2020-08-14
